# Patient Record
Sex: FEMALE | NOT HISPANIC OR LATINO | URBAN - METROPOLITAN AREA
[De-identification: names, ages, dates, MRNs, and addresses within clinical notes are randomized per-mention and may not be internally consistent; named-entity substitution may affect disease eponyms.]

---

## 2017-07-31 ENCOUNTER — CHARTING TRANS (OUTPATIENT)
Dept: ALLERGY | Age: 32
End: 2017-07-31

## 2017-07-31 ENCOUNTER — LAB SERVICES (OUTPATIENT)
Dept: OTHER | Age: 32
End: 2017-07-31

## 2017-07-31 ENCOUNTER — MYAURORA ACCOUNT LINK (OUTPATIENT)
Dept: OTHER | Age: 32
End: 2017-07-31

## 2017-08-02 LAB
GLIADIN PEPTIDE IGA SER IA-ACNC: 0.4 U/ML (ref 0–7)
GLIADIN PEPTIDE IGG SER IA-ACNC: <0.4 U/ML (ref 0–7)
TTG IGA SER IA-ACNC: 0.2 U/ML (ref 0–7)
TTG IGG SER IA-ACNC: <0.6 U/ML (ref 0–7)

## 2017-08-03 LAB
ALMOND IGE QN: <0.1 KU/L (ref 0–0.1)
BRAZIL NUT IGE QN: <0.1 KU/L (ref 0–0.1)
CASHEW NUT IGE QN: <0.1 KU/L (ref 0–0.1)
HAZELNUT IGE QN: 0.9 KU/L (ref 0–0.1)
MACADAMIA IGE QN: <0.1 KU/L (ref 0–0.1)
PECAN/HICK NUT IGE QN: <0.1 KU/L (ref 0–0.1)
PINE NUT IGE QN: <0.1 KU/L (ref 0–0.1)
PISTACHIO IGE QN: <0.1 KU/L (ref 0–0.1)
TOTAL IGE SMQN RAST: 78 KU/L (ref 0–100)
WALNUT IGE QN: <0.1 KU/L (ref 0–0.1)

## 2017-11-17 ENCOUNTER — CHARTING TRANS (OUTPATIENT)
Dept: OTHER | Age: 32
End: 2017-11-17

## 2017-12-12 ENCOUNTER — HOSPITAL ENCOUNTER (OUTPATIENT)
Age: 32
Discharge: HOME | End: 2017-12-12
Attending: FAMILY MEDICINE
Payer: COMMERCIAL

## 2017-12-12 VITALS
OXYGEN SATURATION: 100 % | TEMPERATURE: 98 F | DIASTOLIC BLOOD PRESSURE: 76 MMHG | SYSTOLIC BLOOD PRESSURE: 115 MMHG | HEART RATE: 82 BPM | BODY MASS INDEX: 28 KG/M2 | WEIGHT: 160 LBS | RESPIRATION RATE: 16 BRPM

## 2017-12-12 DIAGNOSIS — R11.0 NAUSEA: ICD-10-CM

## 2017-12-12 DIAGNOSIS — K52.9 GASTROENTERITIS: ICD-10-CM

## 2017-12-12 DIAGNOSIS — R19.7 DIARRHEA, UNSPECIFIED TYPE: Primary | ICD-10-CM

## 2017-12-12 PROCEDURE — 80047 BASIC METABLC PNL IONIZED CA: CPT

## 2017-12-12 PROCEDURE — 99215 OFFICE O/P EST HI 40 MIN: CPT

## 2017-12-12 PROCEDURE — 96361 HYDRATE IV INFUSION ADD-ON: CPT

## 2017-12-12 PROCEDURE — 99204 OFFICE O/P NEW MOD 45 MIN: CPT

## 2017-12-12 PROCEDURE — 96360 HYDRATION IV INFUSION INIT: CPT

## 2017-12-12 PROCEDURE — 85025 COMPLETE CBC W/AUTO DIFF WBC: CPT | Performed by: FAMILY MEDICINE

## 2017-12-12 RX ORDER — ONDANSETRON 8 MG/1
8 TABLET, ORALLY DISINTEGRATING ORAL EVERY 12 HOURS PRN
Qty: 10 TABLET | Refills: 0 | Status: SHIPPED | OUTPATIENT
Start: 2017-12-12 | End: 2017-12-22

## 2017-12-12 RX ORDER — POTASSIUM CHLORIDE 20 MEQ/1
40 TABLET, EXTENDED RELEASE ORAL DAILY
Qty: 10 TABLET | Refills: 0 | Status: SHIPPED | OUTPATIENT
Start: 2017-12-12 | End: 2017-12-17

## 2017-12-12 RX ORDER — POTASSIUM CHLORIDE 20 MEQ/1
40 TABLET, EXTENDED RELEASE ORAL ONCE
Status: COMPLETED | OUTPATIENT
Start: 2017-12-12 | End: 2017-12-12

## 2017-12-12 RX ORDER — SODIUM CHLORIDE 9 MG/ML
1000 INJECTION, SOLUTION INTRAVENOUS ONCE
Status: COMPLETED | OUTPATIENT
Start: 2017-12-12 | End: 2017-12-12

## 2017-12-12 RX ORDER — ONDANSETRON 4 MG/1
8 TABLET, ORALLY DISINTEGRATING ORAL ONCE
Status: COMPLETED | OUTPATIENT
Start: 2017-12-12 | End: 2017-12-12

## 2017-12-12 NOTE — ED INITIAL ASSESSMENT (HPI)
X7 days Pt c/o liquid diarrhea \"I have gone what feels like 30 times a day\"  Denies blood in stool, +nuasea, Denies vomiting. Fever last Tuesday but none at this time.  +cramping x4 quad.

## 2017-12-12 NOTE — ED PROVIDER NOTES
Patient Seen in: [id number] [facility]    History   Patient presents with:  Diarrhea  Nausea    Stated Complaint: stomach diar x 1 week     HPI    71-year-old female presents to the clinic today with one-week history of nausea and diarrhea claire [12/12/17 1552]  Resp: 17 [12/12/17 1552]  Temp: 97.7 °F (36.5 °C) [12/12/17 1552]  Temp src: Temporal [12/12/17 1552]  SpO2: 99 % [12/12/17 1552]  O2 Device: None (Room air) [12/12/17 1545]    Current:/68   Pulse 87   Temp 97.7 °F (36.5 °C) (Tempora Normal hemoglobin and hematocrit  I-STAT: Potassium of 3.1. Otherwise normal renal functions. A peripheral IV line was placed  Patient given 1 L of 0.9% normal saline  Zofran 8 mg p.o. ×1  Patient noted to be feeling better.   Stool studies will be ordere

## 2017-12-13 ENCOUNTER — LAB ENCOUNTER (OUTPATIENT)
Dept: LAB | Age: 32
End: 2017-12-13
Attending: FAMILY MEDICINE
Payer: COMMERCIAL

## 2017-12-13 DIAGNOSIS — R19.7 DIARRHEA, UNSPECIFIED TYPE: ICD-10-CM

## 2017-12-13 PROCEDURE — 87046 STOOL CULTR AEROBIC BACT EA: CPT

## 2017-12-13 PROCEDURE — 87045 FECES CULTURE AEROBIC BACT: CPT

## 2017-12-13 PROCEDURE — 87252 VIRUS INOCULATION TISSUE: CPT

## 2017-12-13 PROCEDURE — 89055 LEUKOCYTE ASSESSMENT FECAL: CPT

## 2017-12-13 PROCEDURE — 87427 SHIGA-LIKE TOXIN AG IA: CPT

## 2017-12-13 PROCEDURE — 87493 C DIFF AMPLIFIED PROBE: CPT

## 2017-12-13 PROCEDURE — 87077 CULTURE AEROBIC IDENTIFY: CPT

## 2017-12-15 NOTE — ED NOTES
Received call from [facility] at [address] Unable to perform c-diff as received a formed stool. Wanted MD to be aware.

## 2017-12-23 ENCOUNTER — MYAURORA ACCOUNT LINK (OUTPATIENT)
Dept: OTHER | Age: 32
End: 2017-12-23

## 2017-12-23 ENCOUNTER — CHARTING TRANS (OUTPATIENT)
Dept: URGENT CARE | Age: 32
End: 2017-12-23

## 2017-12-28 ENCOUNTER — HOSPITAL ENCOUNTER (OUTPATIENT)
Age: 32
Discharge: HOME | End: 2017-12-28
Attending: FAMILY MEDICINE
Payer: COMMERCIAL

## 2017-12-28 VITALS
DIASTOLIC BLOOD PRESSURE: 77 MMHG | BODY MASS INDEX: 29.23 KG/M2 | SYSTOLIC BLOOD PRESSURE: 123 MMHG | OXYGEN SATURATION: 100 % | HEART RATE: 78 BPM | RESPIRATION RATE: 20 BRPM | WEIGHT: 165 LBS | TEMPERATURE: 98 F | HEIGHT: 63 IN

## 2017-12-28 DIAGNOSIS — J20.9 ACUTE BRONCHITIS, UNSPECIFIED ORGANISM: Primary | ICD-10-CM

## 2017-12-28 PROCEDURE — 99214 OFFICE O/P EST MOD 30 MIN: CPT

## 2017-12-28 PROCEDURE — 99213 OFFICE O/P EST LOW 20 MIN: CPT

## 2017-12-28 RX ORDER — ALBUTEROL SULFATE 90 UG/1
2 AEROSOL, METERED RESPIRATORY (INHALATION) EVERY 4 HOURS PRN
Qty: 1 INHALER | Refills: 6 | Status: SHIPPED | OUTPATIENT
Start: 2017-12-28 | End: 2018-01-07

## 2017-12-28 RX ORDER — ALBUTEROL SULFATE 90 UG/1
AEROSOL, METERED RESPIRATORY (INHALATION) EVERY 6 HOURS PRN
COMMUNITY

## 2017-12-28 RX ORDER — CEFDINIR 300 MG/1
300 CAPSULE ORAL 2 TIMES DAILY
Qty: 20 CAPSULE | Refills: 0 | Status: SHIPPED | OUTPATIENT
Start: 2017-12-28 | End: 2018-01-07

## 2017-12-28 RX ORDER — PREDNISONE 20 MG/1
40 TABLET ORAL DAILY
Qty: 10 TABLET | Refills: 0 | Status: SHIPPED | OUTPATIENT
Start: 2017-12-28 | End: 2018-01-02

## 2017-12-28 RX ORDER — BENZONATATE 100 MG/1
100 CAPSULE ORAL 3 TIMES DAILY PRN
COMMUNITY
End: 2019-01-31

## 2017-12-28 NOTE — ED PROVIDER NOTES
[place]en in[place] [address] [facility] [place]    History   Patient presents with:  Cough/URI    Stated Complaint: coughing x 7 days    HPI    27-year-old female with a history of asthma presents to the clinic today with chief complaints of chest obvious abnormality, atraumatic  Eyes: conjunctivae/corneas clear. PERRL, EOM's intact. Fundi benign. Ears: normal TM's and external ear canals both ears  Nose: Nares normal. Septum midline. Mucosa normal. No drainage or sinus tenderness. . No nasal flarin medications found. Please discuss with provider.

## 2018-01-07 ENCOUNTER — HOSPITAL ENCOUNTER (OUTPATIENT)
Age: 33
Discharge: HOME | End: 2018-01-07
Attending: FAMILY MEDICINE
Payer: COMMERCIAL

## 2018-01-07 VITALS
HEART RATE: 82 BPM | SYSTOLIC BLOOD PRESSURE: 120 MMHG | DIASTOLIC BLOOD PRESSURE: 80 MMHG | OXYGEN SATURATION: 97 % | RESPIRATION RATE: 16 BRPM | TEMPERATURE: 98 F

## 2018-01-07 DIAGNOSIS — J02.9 PHARYNGITIS, UNSPECIFIED ETIOLOGY: Primary | ICD-10-CM

## 2018-01-07 LAB — POCT MONO: NEGATIVE

## 2018-01-07 PROCEDURE — 99213 OFFICE O/P EST LOW 20 MIN: CPT

## 2018-01-07 PROCEDURE — 86308 HETEROPHILE ANTIBODY SCREEN: CPT | Performed by: FAMILY MEDICINE

## 2018-01-07 PROCEDURE — 99214 OFFICE O/P EST MOD 30 MIN: CPT

## 2018-01-07 NOTE — ED PROVIDER NOTES
Patient Seen in: [address] [facility] [place]    History   Patient presents with:  Sore Throat    Stated Complaint: sore throat,swollen glands,body aches,fever    HPI    34year old female presents for sore throat.  States she was seen here on 12/28/17 Eyes: Conjunctivae and EOM are normal. Pupils are equal, round, and reactive to light. Neck: Normal range of motion. Neck supple. Cardiovascular: Normal rate, regular rhythm, normal heart sounds and intact distal pulses.     Pulmonary/Chest: Effort no

## 2018-01-07 NOTE — ED INITIAL ASSESSMENT (HPI)
Pt sts 4 days ago began with sore throat- progressively worsening. ? Fever. Here 2 weeks ago for Bronchitis- treated with antibiotic. Symptoms improved but didn't fully resolve.

## 2018-11-27 VITALS
SYSTOLIC BLOOD PRESSURE: 120 MMHG | OXYGEN SATURATION: 100 % | HEART RATE: 100 BPM | DIASTOLIC BLOOD PRESSURE: 80 MMHG | TEMPERATURE: 101.5 F | RESPIRATION RATE: 20 BRPM

## 2018-11-28 VITALS
DIASTOLIC BLOOD PRESSURE: 80 MMHG | TEMPERATURE: 97.7 F | SYSTOLIC BLOOD PRESSURE: 114 MMHG | HEART RATE: 72 BPM | HEIGHT: 63 IN | WEIGHT: 170 LBS | BODY MASS INDEX: 30.12 KG/M2

## 2019-01-31 ENCOUNTER — HOSPITAL ENCOUNTER (OUTPATIENT)
Age: 34
Discharge: HOME | End: 2019-01-31
Attending: FAMILY MEDICINE
Payer: COMMERCIAL

## 2019-01-31 VITALS
TEMPERATURE: 98 F | HEART RATE: 69 BPM | OXYGEN SATURATION: 96 % | DIASTOLIC BLOOD PRESSURE: 81 MMHG | SYSTOLIC BLOOD PRESSURE: 120 MMHG | RESPIRATION RATE: 16 BRPM

## 2019-01-31 DIAGNOSIS — J01.90 ACUTE SINUSITIS, RECURRENCE NOT SPECIFIED, UNSPECIFIED LOCATION: Primary | ICD-10-CM

## 2019-01-31 PROCEDURE — 99213 OFFICE O/P EST LOW 20 MIN: CPT

## 2019-01-31 PROCEDURE — 99214 OFFICE O/P EST MOD 30 MIN: CPT

## 2019-01-31 RX ORDER — GUAIFENESIN 600 MG
1200 TABLET, EXTENDED RELEASE 12 HR ORAL 2 TIMES DAILY
COMMUNITY

## 2019-01-31 RX ORDER — AMOXICILLIN 875 MG/1
875 TABLET, COATED ORAL 2 TIMES DAILY
Qty: 20 TABLET | Refills: 0 | Status: SHIPPED | OUTPATIENT
Start: 2019-01-31 | End: 2019-02-10

## 2019-02-01 NOTE — ED INITIAL ASSESSMENT (HPI)
Pt sts cold symptoms for past 9 days. Fever on Friday- none since. Sinus pressure x 7 days ago- thick, yellow/green.

## 2019-02-01 NOTE — ED PROVIDER NOTES
Patient presents with:  Cough/URI    HPI:     Cindy Lozada is a 28year old female who presents with a chief complaint of sinus pain/pressure, facial pain, frontal headache, nasal congestion, thick colored nasal drainage, loss of smell, post nasal d tenderness bilateral  Throat: lips, mucosa, and tongue normal; teeth and gums normal  Neck: no adenopathy and supple, symmetrical, trachea midline  Back: symmetric, no curvature. ROM normal. No CVA tenderness.   Lungs: clear to auscultation bilaterally and

## 2019-12-15 ENCOUNTER — HOSPITAL ENCOUNTER (OUTPATIENT)
Age: 34
Discharge: HOME | End: 2019-12-15
Attending: FAMILY MEDICINE
Payer: COMMERCIAL

## 2019-12-15 VITALS
WEIGHT: 168 LBS | TEMPERATURE: 98 F | BODY MASS INDEX: 29.77 KG/M2 | RESPIRATION RATE: 18 BRPM | HEIGHT: 63 IN | HEART RATE: 74 BPM | OXYGEN SATURATION: 99 %

## 2019-12-15 DIAGNOSIS — J01.00 ACUTE NON-RECURRENT MAXILLARY SINUSITIS: Primary | ICD-10-CM

## 2019-12-15 PROCEDURE — 99214 OFFICE O/P EST MOD 30 MIN: CPT

## 2019-12-15 PROCEDURE — 99213 OFFICE O/P EST LOW 20 MIN: CPT

## 2019-12-15 RX ORDER — FLUTICASONE PROPIONATE 50 MCG
SPRAY, SUSPENSION (ML) NASAL
Qty: 1 INHALER | Refills: 0 | Status: SHIPPED | OUTPATIENT
Start: 2019-12-15

## 2019-12-15 RX ORDER — AMOXICILLIN AND CLAVULANATE POTASSIUM 875; 125 MG/1; MG/1
875 TABLET, FILM COATED ORAL 2 TIMES DAILY
Qty: 20 TABLET | Refills: 0 | Status: SHIPPED | OUTPATIENT
Start: 2019-12-15

## 2019-12-15 RX ORDER — PREDNISONE 20 MG/1
TABLET ORAL
Qty: 10 TABLET | Refills: 0 | Status: SHIPPED | OUTPATIENT
Start: 2019-12-15

## 2019-12-15 RX ORDER — FAMOTIDINE 20 MG
TABLET ORAL
COMMUNITY

## 2019-12-15 NOTE — ED INITIAL ASSESSMENT (HPI)
Sinus congestion and pressure since Thursday. Today worse. Woke up today with increased pressure in left side, teeth, and behind left eye.

## 2019-12-15 NOTE — ED PROVIDER NOTES
Patient Seen in: [address] [facility]      History   Patient presents with:  Sinus Problem    Stated Complaint: SINUS PRESSURE/CONGESTION X 4 DAYS    HPI    This 76-year-old female who is 13 days status post normal spontaneous vaginal delivery Turbinates very congested, no bleeding noted. Left maxillary sinus tenderness upon palpation  PHARYNX:  Injected with post nasal drip noted, airway patent, uvula midline  NECK:  No cervical lymphadenopathy.  No thyromegaly,  HEART: Regular rate and rhythm, Humidified air. Continue with the Kavya pot. Follow-up with your primary doctor in 3 to 5 days for any new or worsening symptoms. You may continue to breast-feed with these medications.

## (undated) NOTE — LETTER
[facility] [place]  [phone] [id number]  Dept: [phone]  Dept Fax: [phone]      December 12, 2017    Patient: Melinda Calderón   Date of Visit: 12/12/2017       To Whom It May Concern:    Veronica Ruibn was seen and tr